# Patient Record
Sex: MALE | Race: OTHER | HISPANIC OR LATINO
[De-identification: names, ages, dates, MRNs, and addresses within clinical notes are randomized per-mention and may not be internally consistent; named-entity substitution may affect disease eponyms.]

---

## 2018-01-22 PROBLEM — Z00.00 ENCOUNTER FOR PREVENTIVE HEALTH EXAMINATION: Status: ACTIVE | Noted: 2018-01-22

## 2018-02-16 ENCOUNTER — APPOINTMENT (OUTPATIENT)
Dept: OPHTHALMOLOGY | Facility: CLINIC | Age: 83
End: 2018-02-16
Payer: MEDICARE

## 2018-02-16 PROCEDURE — 92015 DETERMINE REFRACTIVE STATE: CPT

## 2018-02-16 PROCEDURE — 92014 COMPRE OPH EXAM EST PT 1/>: CPT

## 2018-02-16 PROCEDURE — 92226: CPT | Mod: LT

## 2018-08-17 ENCOUNTER — APPOINTMENT (OUTPATIENT)
Dept: OPHTHALMOLOGY | Facility: CLINIC | Age: 83
End: 2018-08-17

## 2020-05-01 ENCOUNTER — APPOINTMENT (OUTPATIENT)
Dept: GASTROENTEROLOGY | Facility: CLINIC | Age: 85
End: 2020-05-01
Payer: MEDICARE

## 2020-05-01 VITALS
BODY MASS INDEX: 23.54 KG/M2 | HEART RATE: 84 BPM | WEIGHT: 150 LBS | HEIGHT: 67 IN | SYSTOLIC BLOOD PRESSURE: 144 MMHG | DIASTOLIC BLOOD PRESSURE: 76 MMHG

## 2020-05-01 PROCEDURE — 99204 OFFICE O/P NEW MOD 45 MIN: CPT

## 2020-05-01 NOTE — ASSESSMENT
[FreeTextEntry1] : -upper abd pain - unclear etiology - r/o neoplasm - ?gallstones - vs functional\par Will plan egd after stress test/cleared (Dr. Lombardi). Cont PPI.  US.\par \par -brbpr - resolved - will plan colonoscopy (w/ EGD) after stress test - pt will call after resulted\par \par -CAD - stress test planned for Monday - pt will call me to book egd/colonoscopy after cards w/u

## 2020-05-01 NOTE — HISTORY OF PRESENT ILLNESS
[FreeTextEntry1] : 85m HTN, HLD, GERD, CAD/Mt francoise cath '18/stent hx, was in ER 4/29/20 for  1mo LUQ pain/fullness/distension, and then 3d BRBPR. Pt was heme neg in ER [facility].  Further w/u:\par \par 4/29/20: Hb 11.9, normal cbc otherwise; basliene 12s; cmet/lipse unrevealing; UA neg\par CT+: no acute findings; R ing hernia w/ fat; diverticulosis, no it is; renal cysts\par \par Pt states epig pain and dyspnea for 2-3y w/ oral intake.  Has stress test planned for Monday.  No wt loss.  Sx of pain progressed in last month.   No further BRBPR.  Colonoscopy reportedly normal about '12.  No melena, wt loss.  No NSAIDs\par \par Soc:  no tobacco or significant EtOH\par FHx: no FHx GI malignancy or IBD\par \par ROS:\par Constitutional:: no weight loss, fevers\par ENT: no deafness\par Eyes: not blind\par Neck: no LN\par Chest: no dyspnea/cough\par Cardiac: no chest pain\par Vascular: no leg swelling\par GI: no abdominal pain, nausea, vomiting, diarrhea, constipation, rectal bleeding, dysphagia, melena unless otherwise noted in HPI\par : no dysuria, dark urine\par Skin: no rashes, jaundice\par Heme: no bleeding\par Endocrine: no DM unless otherwise stated in HPI\par \par Px: (VS noted below)\par General: NAD\par Eyes: anicteric\par Oropharynx:  clear\par Neck: no LN\par Chest: normal respiratory effort\par CVS: regular\par Abd: soft, NT, ND, +BS, no HSM\par Ext: no atrophy\par Neuro: grossly nonfocal\par \par Labs/imaging/prior endoscopic results reviewed to the extent available and noted in HPI\par

## 2020-05-01 NOTE — CONSULT LETTER
[Dear  ___] : Dear  [unfilled], [FreeTextEntry1] : Dear Dr. Chamorro,\par \par I had the pleasure of evaluating your patient,  KENZIE LOPEZ.\par \par Please refer to my note below.\par \par Thank you very much for allowing me to participate in the care of this patient.  If you have any questions, please do not hesitate to contact me.\par \par Sincerely, \par \par Kishore Villar MD\par

## 2020-05-01 NOTE — REASON FOR VISIT
[Consultation] : a consultation visit [FreeTextEntry1] : Kindly asked by Dr. Chamorro to consult and evaluate patient for   rectal bleeding, abd pain                 \par A copy of this note is being sent to physician requesting consultation.

## 2020-08-24 ENCOUNTER — APPOINTMENT (OUTPATIENT)
Dept: GASTROENTEROLOGY | Facility: CLINIC | Age: 85
End: 2020-08-24
Payer: MEDICARE

## 2020-08-24 VITALS
WEIGHT: 150 LBS | HEIGHT: 67 IN | DIASTOLIC BLOOD PRESSURE: 60 MMHG | SYSTOLIC BLOOD PRESSURE: 126 MMHG | HEART RATE: 79 BPM | BODY MASS INDEX: 23.54 KG/M2

## 2020-08-24 DIAGNOSIS — K62.5 HEMORRHAGE OF ANUS AND RECTUM: ICD-10-CM

## 2020-08-24 PROCEDURE — 99214 OFFICE O/P EST MOD 30 MIN: CPT

## 2020-08-25 PROBLEM — K62.5 RECTAL BLEEDING: Status: ACTIVE | Noted: 2020-05-01

## 2020-08-25 NOTE — HISTORY OF PRESENT ILLNESS
[FreeTextEntry1] : 86m HTN, HLD, GERD, CAD/Mt francoise cath '18/stent hx, was in ER 4/29/20 for  1mo LUQ pain/fullness/distension, and then 3d BRBPR. Pt was heme neg in ER [facility].  Further w/u:\par \par 4/29/20: Hb 11.9, normal cbc otherwise; basliene 12s; cmet/lipse unrevealing; UA neg\par CT+: no acute findings; R ing hernia w/ fat; diverticulosis, no it is; renal cysts\par \par PT was to have stress test pending at last visit and I had advised to call for egd/colon after cardiology w/u complete.  He states he has not seen cards Dr. Lombardi since before our last visit.\par Epigastric discomfort persists, and still no further bleeding.   Bloating.  On PPI.  Epigastric constant discomfort, somewhat postprandial.\par \par Colonoscopy reportedly normal about '12.  No melena, wt loss.  No NSAIDs\par \par Soc:  no tobacco or significant EtOH\par FHx: no FHx GI malignancy or IBD\par \par ROS:\par Constitutional:: no weight loss, fevers\par ENT: no deafness\par Eyes: not blind\par Neck: no LN\par Chest: no dyspnea/cough\par Cardiac: no chest pain\par Vascular: no leg swelling\par GI: no abdominal pain, nausea, vomiting, diarrhea, constipation, rectal bleeding, dysphagia, melena unless otherwise noted in HPI\par : no dysuria, dark urine\par Skin: no rashes, jaundice\par Heme: no bleeding\par Endocrine: no DM unless otherwise stated in HPI\par \par Px: (VS noted below)\par General: NAD\par Eyes: anicteric\par Oropharynx:  clear\par Neck: no LN\par Chest: normal respiratory effort\par CVS: regular\par Abd: soft, NT, ND, +BS, no HSM\par Ext: no atrophy\par Neuro: grossly nonfocal\par \par Labs/imaging/prior endoscopic results reviewed to the extent available and noted in HPI\par

## 2020-08-25 NOTE — ASSESSMENT
[FreeTextEntry1] : -upper abd pain - unclear etiology - r/o neoplasm - ?gallstones - vs functional\par Will plan egd pending cards cleared (Dr. Lombardi). Cont PPI.  US - never went after last visit.\par \par -brbpr - resolved - will plan colonoscopy (w/ EGD) after cards clear.  \par \par -CAD - gave pt note to give to Dr. Lombardi to get cards clearance and Pt to call me ASAP to book egd/colon once cleared.

## 2020-11-24 ENCOUNTER — NON-APPOINTMENT (OUTPATIENT)
Age: 85
End: 2020-11-24

## 2023-11-01 ENCOUNTER — APPOINTMENT (OUTPATIENT)
Dept: GASTROENTEROLOGY | Facility: CLINIC | Age: 88
End: 2023-11-01
Payer: MEDICARE

## 2023-11-01 VITALS
HEIGHT: 67 IN | SYSTOLIC BLOOD PRESSURE: 110 MMHG | BODY MASS INDEX: 22.44 KG/M2 | DIASTOLIC BLOOD PRESSURE: 70 MMHG | WEIGHT: 143 LBS

## 2023-11-01 DIAGNOSIS — I25.10 ATHEROSCLEROTIC HEART DISEASE OF NATIVE CORONARY ARTERY W/OUT ANGINA PECTORIS: ICD-10-CM

## 2023-11-01 DIAGNOSIS — R10.10 UPPER ABDOMINAL PAIN, UNSPECIFIED: ICD-10-CM

## 2023-11-01 PROCEDURE — 99204 OFFICE O/P NEW MOD 45 MIN: CPT

## 2023-11-01 RX ORDER — SIMVASTATIN 40 MG/1
40 TABLET, FILM COATED ORAL
Refills: 0 | Status: ACTIVE | COMMUNITY

## 2023-11-01 RX ORDER — TAMSULOSIN HYDROCHLORIDE 0.4 MG/1
0.4 CAPSULE ORAL
Refills: 0 | Status: ACTIVE | COMMUNITY

## 2023-11-01 RX ORDER — LOSARTAN POTASSIUM 100 MG/1
100 TABLET, FILM COATED ORAL
Refills: 0 | Status: ACTIVE | COMMUNITY

## 2023-11-28 ENCOUNTER — RESULT REVIEW (OUTPATIENT)
Age: 88
End: 2023-11-28

## 2023-11-29 ENCOUNTER — TRANSCRIPTION ENCOUNTER (OUTPATIENT)
Age: 88
End: 2023-11-29

## 2023-11-29 ENCOUNTER — APPOINTMENT (OUTPATIENT)
Dept: GASTROENTEROLOGY | Facility: HOSPITAL | Age: 88
End: 2023-11-29

## 2023-11-29 DIAGNOSIS — K21.9 GASTRO-ESOPHAGEAL REFLUX DISEASE W/OUT ESOPHAGITIS: ICD-10-CM

## 2023-11-29 RX ORDER — DEXLANSOPRAZOLE 60 MG/1
60 CAPSULE, DELAYED RELEASE ORAL
Qty: 30 | Refills: 5 | Status: ACTIVE | COMMUNITY
Start: 2023-11-29 | End: 1900-01-01

## 2024-04-10 ENCOUNTER — OFFICE (OUTPATIENT)
Facility: LOCATION | Age: 89
Setting detail: OPHTHALMOLOGY
End: 2024-04-10
Payer: COMMERCIAL

## 2024-04-10 DIAGNOSIS — H40.013: ICD-10-CM

## 2024-04-10 DIAGNOSIS — H43.813: ICD-10-CM

## 2024-04-10 PROCEDURE — 92133 CPTRZD OPH DX IMG PST SGM ON: CPT | Performed by: OPTOMETRIST

## 2024-04-10 PROCEDURE — 92202 OPSCPY EXTND ON/MAC DRAW: CPT | Performed by: OPTOMETRIST

## 2024-04-10 PROCEDURE — 92004 COMPRE OPH EXAM NEW PT 1/>: CPT | Performed by: OPTOMETRIST
